# Patient Record
Sex: FEMALE | Race: WHITE | URBAN - METROPOLITAN AREA
[De-identification: names, ages, dates, MRNs, and addresses within clinical notes are randomized per-mention and may not be internally consistent; named-entity substitution may affect disease eponyms.]

---

## 2021-09-01 ENCOUNTER — LAB OUTSIDE AN ENCOUNTER (OUTPATIENT)
Dept: URBAN - METROPOLITAN AREA CLINIC 80 | Facility: CLINIC | Age: 41
End: 2021-09-01

## 2021-09-01 ENCOUNTER — OFFICE VISIT (OUTPATIENT)
Dept: URBAN - METROPOLITAN AREA CLINIC 80 | Facility: CLINIC | Age: 41
End: 2021-09-01
Payer: COMMERCIAL

## 2021-09-01 ENCOUNTER — DASHBOARD ENCOUNTERS (OUTPATIENT)
Age: 41
End: 2021-09-01

## 2021-09-01 ENCOUNTER — WEB ENCOUNTER (OUTPATIENT)
Dept: URBAN - METROPOLITAN AREA CLINIC 80 | Facility: CLINIC | Age: 41
End: 2021-09-01

## 2021-09-01 DIAGNOSIS — R53.83 FATIGUE, UNSPECIFIED TYPE: ICD-10-CM

## 2021-09-01 DIAGNOSIS — Z80.0 FAMILY HISTORY OF COLON CANCER IN MOTHER: ICD-10-CM

## 2021-09-01 DIAGNOSIS — R19.4 CHANGE IN BOWEL HABITS: ICD-10-CM

## 2021-09-01 PROCEDURE — 99245 OFF/OP CONSLTJ NEW/EST HI 55: CPT | Performed by: INTERNAL MEDICINE

## 2021-09-01 RX ORDER — SODIUM, POTASSIUM,MAG SULFATES 17.5-3.13G
177 ML SOLUTION, RECONSTITUTED, ORAL ORAL AS DIRECTED
Qty: 1 | Refills: 0 | OUTPATIENT
Start: 2021-09-01 | End: 2021-09-02

## 2021-09-01 NOTE — HPI-TODAY'S VISIT:
The patient was referred by Dr. María Mccullough for abnormal stools .   A copy of this document is being forwarded to the referring provider. Patient presents stating her PCP sent her for stool studies to rule out parasites - came back negative per the patient She has been seeing some stringy stuff in her stool - mucus at times- looked almost like she had eaten a bunch of fiber or oranges but was not She has had increased lethargy - no blood work done - has been for about 6-7 months She also gets some flashes of heat in the back of her neck and makes her want to pass out - she sees neurologist next week No abd pain, nausea or emesis She started to take some Cinnamon pills to help Normal bowel habit on the Cinnamon is 2 BM a day - No brbpr or melena Mom had colon cancer Patient has never had a colonoscopy she was having increased itching in her rectum as well  Prior to the cinnamon she would have 1 BM a day and would have to strain Has not eaten gluten for about 9 years - it helped her psoriasis when she stopped eating it

## 2021-09-03 LAB
A/G RATIO: 1.8
ALBUMIN: 4.2
ALKALINE PHOSPHATASE: 53
ALT (SGPT): 23
AST (SGOT): 21
BASO (ABSOLUTE): 0.1
BASOS: 1
BILIRUBIN, TOTAL: 0.5
BUN/CREATININE RATIO: 16
BUN: 11
C-REACTIVE PROTEIN, QUANT: 1
CALCIUM: 9.1
CARBON DIOXIDE, TOTAL: 21
CHLORIDE: 103
CREATININE: 0.69
DEAMIDATED GLIADIN ABS, IGA: 6
DEAMIDATED GLIADIN ABS, IGG: 2
EGFR IF AFRICN AM: 125
EGFR IF NONAFRICN AM: 108
ENDOMYSIAL ANTIBODY IGA: NEGATIVE
EOS (ABSOLUTE): 0.2
EOS: 4
FERRITIN, SERUM: 34
FOLATE (FOLIC ACID), SERUM: >20
GLOBULIN, TOTAL: 2.4
GLUCOSE: 82
HEMATOCRIT: 42.5
HEMATOLOGY COMMENTS:: (no result)
HEMOGLOBIN: 14.1
IMMATURE CELLS: (no result)
IMMATURE GRANS (ABS): 0
IMMATURE GRANULOCYTES: 0
IMMUNOGLOBULIN A, QN, SERUM: 236
IRON BIND.CAP.(TIBC): 379
IRON SATURATION: 23
IRON: 86
LYMPHS (ABSOLUTE): 1.7
LYMPHS: 25
MCH: 31.9
MCHC: 33.2
MCV: 96
MONOCYTES(ABSOLUTE): 0.6
MONOCYTES: 9
NEUTROPHILS (ABSOLUTE): 4.1
NEUTROPHILS: 61
NRBC: (no result)
PLATELETS: 313
POTASSIUM: 4.5
PROTEIN, TOTAL: 6.6
RBC: 4.42
RDW: 12.1
SODIUM: 139
T-TRANSGLUTAMINASE (TTG) IGA: <2
T-TRANSGLUTAMINASE (TTG) IGG: <2
T4,FREE(DIRECT): 1.3
TSH: 1.95
UIBC: 293
VITAMIN B12: 748
VITAMIN D, 25-HYDROXY: 34.7
WBC: 6.7

## 2021-09-15 ENCOUNTER — TELEPHONE ENCOUNTER (OUTPATIENT)
Dept: URBAN - METROPOLITAN AREA CLINIC 80 | Facility: CLINIC | Age: 41
End: 2021-09-15

## 2021-10-07 ENCOUNTER — OFFICE VISIT (OUTPATIENT)
Dept: URBAN - METROPOLITAN AREA SURGERY CENTER 19 | Facility: SURGERY CENTER | Age: 41
End: 2021-10-07
Payer: COMMERCIAL

## 2021-10-07 DIAGNOSIS — R19.4 ALTERATION IN BOWEL ELIMINATION: ICD-10-CM

## 2021-10-07 PROCEDURE — G8907 PT DOC NO EVENTS ON DISCHARG: HCPCS | Performed by: INTERNAL MEDICINE

## 2021-10-07 PROCEDURE — 45380 COLONOSCOPY AND BIOPSY: CPT | Performed by: INTERNAL MEDICINE
